# Patient Record
Sex: FEMALE | NOT HISPANIC OR LATINO | URBAN - METROPOLITAN AREA
[De-identification: names, ages, dates, MRNs, and addresses within clinical notes are randomized per-mention and may not be internally consistent; named-entity substitution may affect disease eponyms.]

---

## 2024-11-12 ENCOUNTER — APPOINTMENT (RX ONLY)
Dept: URBAN - METROPOLITAN AREA CLINIC 20 | Facility: CLINIC | Age: 59
Setting detail: DERMATOLOGY
End: 2024-11-12

## 2024-11-12 DIAGNOSIS — L98.8 OTHER SPECIFIED DISORDERS OF THE SKIN AND SUBCUTANEOUS TISSUE: ICD-10-CM

## 2024-11-12 PROCEDURE — ? BOTOX

## 2024-11-12 ASSESSMENT — LOCATION DETAILED DESCRIPTION DERM
LOCATION DETAILED: GLABELLA
LOCATION DETAILED: LEFT CENTRAL MALAR CHEEK
LOCATION DETAILED: RIGHT SUPERIOR CENTRAL MALAR CHEEK
LOCATION DETAILED: LEFT SUPERIOR CENTRAL MALAR CHEEK

## 2024-11-12 ASSESSMENT — LOCATION ZONE DERM: LOCATION ZONE: FACE

## 2024-11-12 ASSESSMENT — LOCATION SIMPLE DESCRIPTION DERM
LOCATION SIMPLE: GLABELLA
LOCATION SIMPLE: RIGHT CHEEK
LOCATION SIMPLE: LEFT CHEEK

## 2024-11-12 NOTE — PROCEDURE: BOTOX
Show Masseter Units: Yes
Left Periorbital Skin Units: 0
Show Right And Left Periorbital Units: No
Incrementing Botox Units: By 0.5 Units
Detail Level: Detailed
Glabellar Complex Units: 25
Consent: Written consent obtained. Risks include but not limited to lid/brow ptosis, bruising, swelling, diplopia, temporary effect, incomplete chemical denervation.
Show Inventory Tab: Show
Dilution (U/0.1 Cc): 4
Post-Care Instructions: Patient instructed to not lie down for 4 hours and limit physical activity for 24 hours.
Periorbital Skin Units: 20

## 2025-02-05 ENCOUNTER — APPOINTMENT (OUTPATIENT)
Dept: URBAN - METROPOLITAN AREA CLINIC 20 | Facility: CLINIC | Age: 60
Setting detail: DERMATOLOGY
End: 2025-02-05

## 2025-02-05 DIAGNOSIS — Z41.9 ENCOUNTER FOR PROCEDURE FOR PURPOSES OTHER THAN REMEDYING HEALTH STATE, UNSPECIFIED: ICD-10-CM

## 2025-02-05 PROCEDURE — ? COSMETIC CONSULTATION - PULSED-DYE LASER

## 2025-02-05 PROCEDURE — ? INVENTORY

## 2025-02-05 PROCEDURE — ? ADDITIONAL NOTES

## 2025-02-05 PROCEDURE — ? COSMETIC CONSULTATION: FRACTIONAL RESURFACING

## 2025-02-05 PROCEDURE — ? COSMETIC CONSULTATION: POTENZA RF MICRONEEDLING

## 2025-02-05 NOTE — PROCEDURE: ADDITIONAL NOTES
Detail Level: Simple
Render Risk Assessment In Note?: no
Additional Notes: Patient agreed to milder version of SmartSkin CO2 other than more aggressive she had 10 years ago.  Patient understands if she wants more firming than Sylfirm X or RF/MN would be optimal and will keep in reserve.

## 2025-02-17 ENCOUNTER — APPOINTMENT (OUTPATIENT)
Dept: URBAN - METROPOLITAN AREA CLINIC 20 | Facility: CLINIC | Age: 60
Setting detail: DERMATOLOGY
End: 2025-02-17

## 2025-02-17 DIAGNOSIS — Z41.9 ENCOUNTER FOR PROCEDURE FOR PURPOSES OTHER THAN REMEDYING HEALTH STATE, UNSPECIFIED: ICD-10-CM

## 2025-02-17 PROCEDURE — ? INVENTORY

## 2025-02-17 PROCEDURE — ? PHOTO-DOCUMENTATION

## 2025-02-17 PROCEDURE — ? SMARTSKIN CO2 LASER

## 2025-02-17 PROCEDURE — ? ADDITIONAL NOTES

## 2025-02-17 NOTE — PROCEDURE: SMARTSKIN CO2 LASER
Number Of Passes: 1
Pitch: [phone]
Topical Anesthesia Type: 23% lidocaine, 7% tetracaine
Schmidt: 12
Dwell: 600
Dwell: 100
Indication: resurfacing
Pitch: [id number]
Detail Level: Zone
Eye Shield Text: Before starting the procedure, intraocular eye shields were applied to the patient.
Location: full face except eyelids
Were Eye Shields Used?: No
Anesthesia Type: 1% lidocaine with epinephrine
Schmidt: 15
Post-Care Instructions: I reviewed with the patient in detail post-care instructions. Patient should avoid sun until area fully healed.
Location: neck
Length Of Topical Anesthesia Application (Optional): 45 minutes
Consent: Written consent obtained, risks reviewed including but not limited to pain and incomplete improvement of hyperhidrosis.

## 2025-02-17 NOTE — PROCEDURE: ADDITIONAL NOTES
Render Risk Assessment In Note?: no
Detail Level: Simple
Additional Notes: Light version no exosomes 1500 total. 
Additional Notes: Cool compresses were applied post treatment for 20 minutes.\\n Cicalfate cream was applied post treatment.\\nBrow bone and lower eyelid were treated using same parameters as neck.

## 2025-04-04 ENCOUNTER — APPOINTMENT (OUTPATIENT)
Dept: URBAN - METROPOLITAN AREA CLINIC 20 | Facility: CLINIC | Age: 60
Setting detail: DERMATOLOGY
End: 2025-04-04

## 2025-04-04 DIAGNOSIS — Z41.9 ENCOUNTER FOR PROCEDURE FOR PURPOSES OTHER THAN REMEDYING HEALTH STATE, UNSPECIFIED: ICD-10-CM

## 2025-04-04 PROCEDURE — ? INVENTORY

## 2025-04-04 PROCEDURE — ? COSMETIC FOLLOW-UP

## 2025-04-04 PROCEDURE — ? VBEAM PERFECTA LASER

## 2025-04-04 PROCEDURE — ? PHOTO-DOCUMENTATION

## 2025-04-04 PROCEDURE — ? ADDITIONAL NOTES

## 2025-04-04 NOTE — PROCEDURE: ADDITIONAL NOTES
Additional Notes: No charge touch up from previous procedure.
Detail Level: Simple
Render Risk Assessment In Note?: no
Additional Notes: Additional VBeam plan with no protocols included to be ignored.  EMA not allowing to delete.

## 2025-04-04 NOTE — PROCEDURE: VBEAM PERFECTA LASER
Delay Time (Ms): 20
Is There A Fourth Setting?: no
Spray Time (Ms): 0
Pulse Duration: 10 ms
Post-Care Instructions: I reviewed with the patient in detail post-care instructions.  Cool compresses or cold gel packs may be applied after treatment.  Exposure to the sun should be avoided and sun protection and sunscreen should be used.
Pulse Duration: 1.5 ms
Spot Size: 3 mm
Treatment Number: 1
Post-Procedure: Following the procedure the post care instructions were reviewed with the patient.
Spray Time (Ms): 30
Spot Size: 3x10 mm
Pre-Procedure: The treatment areas identified by the patient were cleansed and treatment was performed with the parameters mentioned above.
Skin Type (Optional): II
Spot Size: 10 mm
Detail Level: Zone
Pulse Duration: 6 ms
Consent: Written consent obtained.  Risks were reviewed including but not limited to crusting, scabbing, blistering, scarring, darker or lighter pigmentary change, bruising, and/or incomplete response.
Is There A Second Setting?: yes
Number Of Pulses: Total 237
Location: cheeks, chin, nose, neck
Fluence (J/Cm2): 6
Fluence (J/Cm2): 11

## 2025-04-04 NOTE — PROCEDURE: COSMETIC FOLLOW-UP
Detail Level: Zone
Treatment Override (Free Text): SmartSkin CO2
Comments (Free Text): Patient has visible smoothing and clarity to tone and texture of treated area post SmartSkin CO2.  Milld erythema to be treated with VBeam today.
Global Improvement: Marked
Side Effects Or Complications: None
Patient Satisfaction: Very pleased

## 2025-05-28 ENCOUNTER — APPOINTMENT (OUTPATIENT)
Dept: URBAN - METROPOLITAN AREA CLINIC 20 | Facility: CLINIC | Age: 60
Setting detail: DERMATOLOGY
End: 2025-05-28

## 2025-05-28 DIAGNOSIS — L98.8 OTHER SPECIFIED DISORDERS OF THE SKIN AND SUBCUTANEOUS TISSUE: ICD-10-CM

## 2025-05-28 PROCEDURE — ? BOTOX

## 2025-05-28 PROCEDURE — ? INVENTORY

## 2025-05-28 ASSESSMENT — LOCATION DETAILED DESCRIPTION DERM: LOCATION DETAILED: GLABELLA

## 2025-05-28 ASSESSMENT — LOCATION ZONE DERM: LOCATION ZONE: FACE

## 2025-05-28 ASSESSMENT — LOCATION SIMPLE DESCRIPTION DERM: LOCATION SIMPLE: GLABELLA

## 2025-05-28 NOTE — PROCEDURE: BOTOX
Incrementing Botox Units: By 0.5 Units
Additional Area 3 Units: 0
Show Additional Area 3: Yes
Consent: Written consent obtained. Risks include but not limited to lid/brow ptosis, bruising, swelling, diplopia, temporary effect, incomplete chemical denervation.
Show Ucl Units: No
Detail Level: Detailed
Post-Care Instructions: Patient instructed to not lie down for 4 hours and limit physical activity for 24 hours.
Dilution (U/0.1 Cc): 4
Show Inventory Tab: Show
Glabellar Complex Units: 25